# Patient Record
Sex: MALE | Race: WHITE
[De-identification: names, ages, dates, MRNs, and addresses within clinical notes are randomized per-mention and may not be internally consistent; named-entity substitution may affect disease eponyms.]

---

## 2018-08-18 ENCOUNTER — HOSPITAL ENCOUNTER (EMERGENCY)
Dept: HOSPITAL 56 - MW.ED | Age: 43
Discharge: HOME | End: 2018-08-18
Payer: COMMERCIAL

## 2018-08-18 DIAGNOSIS — S99.912A: Primary | ICD-10-CM
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
## 2018-08-18 DIAGNOSIS — Z79.899: ICD-10-CM

## 2018-08-18 NOTE — EDM.PDOC
ED HPI GENERAL MEDICAL PROBLEM





- General


Chief Complaint: Lower Extremity Injury/Pain


Stated Complaint: ROLLED HIS LF ANKLE


Time Seen by Provider: 08/18/18 18:05





- History of Present Illness


INITIAL COMMENTS - FREE TEXT/NARRATIVE: 


HISTORY AND PHYSICAL:





History of present illness:


Patient 43-year-old white male presents with concerned acute left ankle injury 

that occurred when he rolled his ankle prior to arrival he had pain and 

swelling since he denies other trauma or concern





Review of systems: 


As per history of present illness and below otherwise all systems reviewed and 

negative.





Past medical history: 


As per history of present illness and as reviewed below otherwise 

noncontributory.





Surgical history: 


As per history of present illness and as reviewed below otherwise 

noncontributory.





Social history: 


No reported history of drug or alcohol abuse.





Family history: 


As per history of present illness and as reviewed below otherwise 

noncontributory.





Physical exam:


HEENT: Atraumatic, normocephalic, pupils reactive, negative for conjunctival 

pallor or scleral icterus, mucous membranes moist, throat clear, neck supple, 

nontender, trachea midline.


Lungs: Clear to auscultation, breath sounds equal bilaterally, chest nontender.


Heart: S1S2, regular, negative for clicks, rubs, or JVD.


Abdomen: Soft, nondistended, nontender. Negative for masses or 

hepatosplenomegaly. Negative for costovertebral tenderness.


Pelvis: Stable nontender.


Genitourinary: Deferred.


Rectal: Deferred.


Extremities: Left ankle is pain and swelling over the lateral malleolus 

Achilles tendon is intact with no crepitation CMS and neurovascular exams 

unremarkable


Neuro: Awake, alert, oriented. Cranial nerves II through XII unremarkable. 

Cerebellum unremarkable. Motor and sensory unremarkable throughout. Exam 

nonfocal.





Diagnostics:


X-ray left ankle





Therapeutics:


To be determined





Impression: 


#1 acute left ankle injury





Definitive disposition and diagnosis as appropriate pending reevaluation and 

review of above.








- Related Data


 Allergies











Allergy/AdvReac Type Severity Reaction Status Date / Time


 


No Known Allergies Allergy   Verified 08/18/18 18:22











Home Meds: 


 Home Meds





Olmesartan [Benicar] 1 tab PO DAILY 08/18/18 [History]


amLODIPine Besylate [Amlodipine Besylate] 0 mg PO DAILY 08/18/18 [History]


atorvaSTATin [Lipitor] 1 tab PO BEDTIME 08/18/18 [History]











Review of Systems





- Review of Systems


Review Of Systems: ROS reveals no pertinent complaints other than HPI.





ED EXAM, GENERAL





- Physical Exam


Exam: See Below (See dictation)





Course





- Vital Signs


Last Recorded V/S: 


 Last Vital Signs











Temp  36.4 C   08/18/18 17:40


 


Pulse  98   08/18/18 17:40


 


Resp  18   08/18/18 17:40


 


BP  158/92 H  08/18/18 17:40


 


Pulse Ox  94 L  08/18/18 17:40














- Orders/Labs/Meds


Orders: 


 Active Orders 24 hr











 Category Date Time Status


 


 Ankle Min 3V Lt [CR] Stat Exams  08/18/18 18:07 Taken














Departure





- Departure


Time of Disposition: 18:40


Disposition: Home, Self-Care 01


Condition: Good


Clinical Impression: 


 Ankle injury








- Discharge Information


*PRESCRIPTION DRUG MONITORING PROGRAM REVIEWED*: Not Applicable


*COPY OF PRESCRIPTION DRUG MONITORING REPORT IN PATIENT TERRI: Not Applicable


Referrals: 


PCP,None [Primary Care Provider] - 


Forms:  ED Department Discharge


Additional Instructions: 


The following information is given to patients seen in the emergency department 

who are being discharged to home. This information is to outline your options 

for follow-up care. We provide all patients seen in our emergency department 

with a follow-up referral.





The need for follow-up, as well as the timing and circumstances, are variable 

depending upon the specifics of your emergency department visit.





If you don't have a primary care physician on staff, we will provide you with a 

referral. We always advise you to contact your personal physician following an 

emergency department visit to inform them of the circumstance of the visit and 

for follow-up with them and/or the need for any referrals to a consulting 

specialist.





The emergency department will also refer you to a specialist when appropriate. 

This referral assures that you have the opportunity for followup care with a 

specialist. All of these measure are taken in an effort to provide you with 

optimal care, which includes your followup.





Under all circumstances we always encourage you to contact your private 

physician who remains a resource for coordinating  your care. When calling for 

followup care, please make the office aware that this follow-up is from your 

recent emergency room visit. If for any reason you are refused follow-up, 

please contact the [facility] emergency department at [phone] 

and asked to speak to the emergency department charge nurse.

















Ace wrap crutches as directed Motrin and Tylenol as directed follow-up primary 

medical doctor as needed as discussed and return as needed as discussed





- My Orders


Last 24 Hours: 


My Active Orders





08/18/18 18:07


Ankle Min 3V Lt [CR] Stat 














- Assessment/Plan


Last 24 Hours: 


My Active Orders





08/18/18 18:07


Ankle Min 3V Lt [CR] Stat

## 2018-08-20 NOTE — CR
EXAM DATE: 18



PATIENT'S AGE: 43



Patient: KAITLIN VALLE



Facility: [place], [place]

Patient ID: [id number]

Site Patient ID: [id number].

Site Accession #: [id number].

: 1975

Study: XRay Extremity Left ankle [id number]/18/2018 6:26:41 PM

Ordering Physician: Doctor Temp



Final Report: 

Indication:

Twisted ankle earlier today.



Technique:

Three views of the left ankle were obtained.



Comparison:

None



Findings:

The ankle mortise is intact. The talar dome is intact. No acute fracture or 
subluxation is identified. A small enthesophyte is identified at the insertion 
site of the Achilles tendon on the calcaneus. Soft tissue swelling is 
identified laterally.



Impression:

Soft tissue swelling laterally. No acute fracture identified.





Dictated by Orly Morin MD @ Aug 18 2018 6:27PM

(Electronic Signature)



Report Signed by Proxy.
SRAVAN